# Patient Record
Sex: MALE | Race: WHITE | NOT HISPANIC OR LATINO
[De-identification: names, ages, dates, MRNs, and addresses within clinical notes are randomized per-mention and may not be internally consistent; named-entity substitution may affect disease eponyms.]

---

## 2018-10-22 ENCOUNTER — APPOINTMENT (OUTPATIENT)
Dept: SURGERY | Facility: CLINIC | Age: 63
End: 2018-10-22
Payer: MEDICAID

## 2018-10-22 VITALS
HEART RATE: 67 BPM | HEIGHT: 69.29 IN | BODY MASS INDEX: 20.5 KG/M2 | WEIGHT: 140 LBS | SYSTOLIC BLOOD PRESSURE: 116 MMHG | TEMPERATURE: 98 F | DIASTOLIC BLOOD PRESSURE: 71 MMHG

## 2018-10-22 PROBLEM — Z00.00 ENCOUNTER FOR PREVENTIVE HEALTH EXAMINATION: Status: ACTIVE | Noted: 2018-10-22

## 2018-10-22 PROCEDURE — 99203 OFFICE O/P NEW LOW 30 MIN: CPT

## 2022-03-14 ENCOUNTER — APPOINTMENT (OUTPATIENT)
Dept: SURGERY | Facility: CLINIC | Age: 67
End: 2022-03-14
Payer: MEDICAID

## 2022-03-14 VITALS
HEIGHT: 69 IN | BODY MASS INDEX: 20.44 KG/M2 | HEART RATE: 84 BPM | WEIGHT: 138 LBS | DIASTOLIC BLOOD PRESSURE: 71 MMHG | SYSTOLIC BLOOD PRESSURE: 117 MMHG | OXYGEN SATURATION: 99 %

## 2022-03-14 VITALS — TEMPERATURE: 96.4 F

## 2022-03-14 DIAGNOSIS — Z78.9 OTHER SPECIFIED HEALTH STATUS: ICD-10-CM

## 2022-03-14 DIAGNOSIS — I10 ESSENTIAL (PRIMARY) HYPERTENSION: ICD-10-CM

## 2022-03-14 DIAGNOSIS — N40.0 BENIGN PROSTATIC HYPERPLASIA WITHOUT LOWER URINARY TRACT SYMPMS: ICD-10-CM

## 2022-03-14 DIAGNOSIS — Z80.1 FAMILY HISTORY OF MALIGNANT NEOPLASM OF TRACHEA, BRONCHUS AND LUNG: ICD-10-CM

## 2022-03-14 PROCEDURE — 99203 OFFICE O/P NEW LOW 30 MIN: CPT

## 2022-03-14 RX ORDER — FINASTERIDE 1 MG/1
TABLET ORAL
Refills: 0 | Status: ACTIVE | COMMUNITY

## 2022-03-14 RX ORDER — FAMOTIDINE 20 MG/1
20 TABLET, FILM COATED ORAL
Qty: 60 | Refills: 0 | Status: ACTIVE | COMMUNITY
Start: 2022-02-15

## 2022-03-14 RX ORDER — AMLODIPINE BESYLATE 5 MG/1
TABLET ORAL
Refills: 0 | Status: ACTIVE | COMMUNITY

## 2022-03-14 RX ORDER — LOSARTAN POTASSIUM 100 MG/1
TABLET, FILM COATED ORAL
Refills: 0 | Status: ACTIVE | COMMUNITY

## 2022-03-14 RX ORDER — LACTULOSE 10 G/15ML
10 SOLUTION ORAL
Qty: 946 | Refills: 0 | Status: ACTIVE | COMMUNITY
Start: 2022-01-25

## 2022-03-14 RX ORDER — FEXOFENADINE HCL 180 MG/1
180 TABLET ORAL
Qty: 30 | Refills: 0 | Status: ACTIVE | COMMUNITY
Start: 2022-02-08

## 2022-03-14 RX ORDER — ALFUZOSIN HYDROCHLORIDE 10 MG/1
10 TABLET, EXTENDED RELEASE ORAL
Refills: 0 | Status: ACTIVE | COMMUNITY

## 2022-03-14 RX ORDER — OMEGA-3-ACID ETHYL ESTERS CAPSULES 1 G/1
1 CAPSULE, LIQUID FILLED ORAL
Qty: 120 | Refills: 0 | Status: ACTIVE | COMMUNITY
Start: 2022-01-03

## 2022-03-14 RX ORDER — ROSUVASTATIN CALCIUM 10 MG/1
10 TABLET, FILM COATED ORAL
Qty: 30 | Refills: 0 | Status: ACTIVE | COMMUNITY
Start: 2022-03-11

## 2022-03-14 RX ORDER — METHYLPREDNISOLONE 4 MG/1
4 TABLET ORAL
Qty: 21 | Refills: 0 | Status: ACTIVE | COMMUNITY
Start: 2022-01-03

## 2022-03-14 RX ORDER — CHOLECALCIFEROL (VITAMIN D3) 25 MCG
TABLET ORAL
Refills: 0 | Status: ACTIVE | COMMUNITY

## 2022-03-14 NOTE — CONSULT LETTER
[Dear  ___] : Dear  [unfilled], [Consult Letter:] : I had the pleasure of evaluating your patient, [unfilled]. [Please see my note below.] : Please see my note below. [Consult Closing:] : Thank you very much for allowing me to participate in the care of this patient.  If you have any questions, please do not hesitate to contact me. [Sincerely,] : Sincerely, [FreeTextEntry3] : Edson Mason MD, FACS

## 2022-03-14 NOTE — HISTORY OF PRESENT ILLNESS
[de-identified] : This is a 66 year  old patient who was referred by Dr. Marc Felton with the chief complaint of having left buttock mass.  He reports having this condition for many years. He denies any trauma to the area.   He denies any fever or  night sweats. Appetite is good and weight is stable.  He states that recently the mass started to  get  bigger and  more symptomatic. He wants to know if it could  be surgically  removed.\par

## 2022-03-14 NOTE — PHYSICAL EXAM
[Alert] : alert [Oriented to Person] : oriented to person [Oriented to Place] : oriented to place [Oriented to Time] : oriented to time [Calm] : calm [de-identified] : He  is alert, well-groomed, and in NAD\par   [de-identified] : anicteric.  Nasal mucosa pink, septum midline. Oral mucosa pink.  Tongue midline, Pharynx without exudates.\par   [de-identified] : Neck supple. Trachea midline. Thyroid isthmus barely palpable, lobes not felt.\par   [de-identified] : \par left buttock mass is mobile, Firm,   Smooth, non-tender,   Well defined. Superficial . No palpable lymph nodes. skin with bluish pigmentation    Mass size - 3  cm x  3 cm.

## 2022-03-14 NOTE — PLAN
[FreeTextEntry1] : Mr. LE  was told significance of findings, options, risks and benefits were explained.  Informed consent for excision     left buttock mass      and potential risks, benefits and alternatives (surgical options were discussed including non-surgical options or the option of no surgery) to the planned surgery were discussed in depth.  All surgical options were discussed including non-surgical treatments.  He wishes to proceed with surgery.  We will plan for surgery on at the next available date, pending any required insurance pre-certification or pre-approval. He agrees to obtain any necessary pre-operative evaluations and testing prior to surgery.\par Patient advised to seek immediate medical attention with any acute change in symptoms or with the development of any new or worsening symptoms.  Patient's questions and concerns addressed to patient's satisfaction, and patient verbalized an understanding of the information discussed.\par \par

## 2022-04-07 ENCOUNTER — OUTPATIENT (OUTPATIENT)
Dept: OUTPATIENT SERVICES | Facility: HOSPITAL | Age: 67
LOS: 1 days | End: 2022-04-07
Payer: MEDICAID

## 2022-04-07 VITALS
HEART RATE: 56 BPM | DIASTOLIC BLOOD PRESSURE: 82 MMHG | WEIGHT: 160.06 LBS | HEIGHT: 66 IN | OXYGEN SATURATION: 96 % | SYSTOLIC BLOOD PRESSURE: 125 MMHG | TEMPERATURE: 99 F | RESPIRATION RATE: 16 BRPM

## 2022-04-07 DIAGNOSIS — I10 ESSENTIAL (PRIMARY) HYPERTENSION: ICD-10-CM

## 2022-04-07 DIAGNOSIS — Z87.438 PERSONAL HISTORY OF OTHER DISEASES OF MALE GENITAL ORGANS: ICD-10-CM

## 2022-04-07 DIAGNOSIS — K21.9 GASTRO-ESOPHAGEAL REFLUX DISEASE WITHOUT ESOPHAGITIS: ICD-10-CM

## 2022-04-07 DIAGNOSIS — M79.89 OTHER SPECIFIED SOFT TISSUE DISORDERS: ICD-10-CM

## 2022-04-07 DIAGNOSIS — Z01.818 ENCOUNTER FOR OTHER PREPROCEDURAL EXAMINATION: ICD-10-CM

## 2022-04-07 DIAGNOSIS — Z78.9 OTHER SPECIFIED HEALTH STATUS: Chronic | ICD-10-CM

## 2022-04-07 PROCEDURE — G0463: CPT

## 2022-04-07 RX ORDER — SODIUM CHLORIDE 9 MG/ML
3 INJECTION INTRAMUSCULAR; INTRAVENOUS; SUBCUTANEOUS EVERY 8 HOURS
Refills: 0 | Status: DISCONTINUED | OUTPATIENT
Start: 2022-04-15 | End: 2022-04-22

## 2022-04-07 NOTE — H&P PST ADULT - NSICDXPASTMEDICALHX_GEN_ALL_CORE_FT
PAST MEDICAL HISTORY:  History of BPH     HTN (hypertension)     Hyperlipemia     Other specified soft tissue disorders

## 2022-04-07 NOTE — H&P PST ADULT - PROBLEM SELECTOR PLAN 4
Pt instructed to take famotidine with a sip of water the day of surgery. Pt will follow up with PMD post procedure.

## 2022-04-07 NOTE — H&P PST ADULT - FALL HARM RISK - UNIVERSAL INTERVENTIONS
Bed in lowest position, wheels locked, appropriate side rails in place/Call bell, personal items and telephone in reach/Instruct patient to call for assistance before getting out of bed or chair/[place] to call system/Physically safe environment - no spills, clutter or unnecessary equipment/Purposeful Proactive Rounding/Room/bathroom lighting operational, light cord in reach

## 2022-04-07 NOTE — H&P PST ADULT - HISTORY OF PRESENT ILLNESS
66 yr old male with PMH: BPH, (finasteride, alfuzosin) ,GERD (famotidine) ,HTN (Metoprolol) presents with specified soft tissue disorder. Pt had a raise area on his left buttock mass with black area on top no drainage or odor. Pt schedule for excision of left buttock mass on 4/15/2022.

## 2022-04-07 NOTE — H&P PST ADULT - NSICDXFAMILYHX_GEN_ALL_CORE_FT
FAMILY HISTORY:  Sibling  Still living? Yes, Estimated age: 75  Family history of diabetes mellitus (DM), Age at diagnosis: Age Unknown  FH: CHF (congestive heart failure), Age at diagnosis: Age Unknown

## 2022-04-07 NOTE — H&P PST ADULT - PROBLEM SELECTOR PLAN 3
schedule for excision of left buttock mass. Pt instructed on the use of chlorhexidene for 3 days including day of surgery. Pt repeated instructions back correctly and given written instructional sheet.      Stop Bang score=3 Pt intermediate risk for TEVIN. Pt will maintain a patent airway during his hospital stay

## 2022-04-07 NOTE — H&P PST ADULT - NSANTHOSAYNRD_GEN_A_CORE
No. TEVIN screening performed.  STOP BANG Legend: 0-2 = LOW Risk; 3-4 = INTERMEDIATE Risk; 5-8 = HIGH Risk

## 2022-04-07 NOTE — H&P PST ADULT - PROBLEM SELECTOR PLAN 2
Pt instructed to take Metoprolol with sip of water am of surgery. Pt instructed to remain NPO after 4/14/2022. Pt will continue regime post procedure with PMD

## 2022-04-14 ENCOUNTER — TRANSCRIPTION ENCOUNTER (OUTPATIENT)
Age: 67
End: 2022-04-14

## 2022-04-15 ENCOUNTER — TRANSCRIPTION ENCOUNTER (OUTPATIENT)
Age: 67
End: 2022-04-15

## 2022-04-15 ENCOUNTER — OUTPATIENT (OUTPATIENT)
Dept: OUTPATIENT SERVICES | Facility: HOSPITAL | Age: 67
LOS: 1 days | End: 2022-04-15
Payer: MEDICAID

## 2022-04-15 ENCOUNTER — RESULT REVIEW (OUTPATIENT)
Age: 67
End: 2022-04-15

## 2022-04-15 ENCOUNTER — APPOINTMENT (OUTPATIENT)
Dept: SURGERY | Facility: HOSPITAL | Age: 67
End: 2022-04-15
Payer: MEDICAID

## 2022-04-15 VITALS
OXYGEN SATURATION: 100 % | TEMPERATURE: 98 F | SYSTOLIC BLOOD PRESSURE: 115 MMHG | DIASTOLIC BLOOD PRESSURE: 68 MMHG | RESPIRATION RATE: 14 BRPM | HEART RATE: 58 BPM

## 2022-04-15 VITALS
SYSTOLIC BLOOD PRESSURE: 144 MMHG | HEIGHT: 66 IN | OXYGEN SATURATION: 99 % | RESPIRATION RATE: 16 BRPM | TEMPERATURE: 98 F | DIASTOLIC BLOOD PRESSURE: 65 MMHG | HEART RATE: 62 BPM | WEIGHT: 160.06 LBS

## 2022-04-15 DIAGNOSIS — Z01.818 ENCOUNTER FOR OTHER PREPROCEDURAL EXAMINATION: ICD-10-CM

## 2022-04-15 DIAGNOSIS — M79.89 OTHER SPECIFIED SOFT TISSUE DISORDERS: ICD-10-CM

## 2022-04-15 DIAGNOSIS — Z78.9 OTHER SPECIFIED HEALTH STATUS: Chronic | ICD-10-CM

## 2022-04-15 PROCEDURE — 21931 EXC BACK LES SC 3 CM/>: CPT | Mod: AS

## 2022-04-15 PROCEDURE — 11403 EXC TR-EXT B9+MARG 2.1-3CM: CPT

## 2022-04-15 PROCEDURE — 88305 TISSUE EXAM BY PATHOLOGIST: CPT

## 2022-04-15 PROCEDURE — 12032 INTMD RPR S/A/T/EXT 2.6-7.5: CPT

## 2022-04-15 PROCEDURE — 88305 TISSUE EXAM BY PATHOLOGIST: CPT | Mod: 26

## 2022-04-15 PROCEDURE — 21931 EXC BACK LES SC 3 CM/>: CPT

## 2022-04-15 RX ORDER — FINASTERIDE 5 MG/1
1 TABLET, FILM COATED ORAL
Qty: 0 | Refills: 0 | DISCHARGE

## 2022-04-15 RX ORDER — ACETAMINOPHEN WITH CODEINE 300MG-30MG
1 TABLET ORAL
Qty: 8 | Refills: 0
Start: 2022-04-15 | End: 2022-04-16

## 2022-04-15 RX ORDER — FAMOTIDINE 10 MG/ML
1 INJECTION INTRAVENOUS
Qty: 0 | Refills: 0 | DISCHARGE

## 2022-04-15 RX ORDER — ROSUVASTATIN CALCIUM 5 MG/1
1 TABLET ORAL
Qty: 0 | Refills: 0 | DISCHARGE

## 2022-04-15 RX ORDER — FENTANYL CITRATE 50 UG/ML
25 INJECTION INTRAVENOUS
Refills: 0 | Status: DISCONTINUED | OUTPATIENT
Start: 2022-04-15 | End: 2022-04-15

## 2022-04-15 RX ORDER — METOPROLOL TARTRATE 50 MG
1 TABLET ORAL
Qty: 0 | Refills: 0 | DISCHARGE

## 2022-04-15 RX ORDER — ACETAMINOPHEN 500 MG
1000 TABLET ORAL ONCE
Refills: 0 | Status: DISCONTINUED | OUTPATIENT
Start: 2022-04-15 | End: 2022-04-22

## 2022-04-15 RX ORDER — SODIUM CHLORIDE 9 MG/ML
1000 INJECTION, SOLUTION INTRAVENOUS
Refills: 0 | Status: DISCONTINUED | OUTPATIENT
Start: 2022-04-15 | End: 2022-04-22

## 2022-04-15 RX ORDER — LOSARTAN POTASSIUM 100 MG/1
1 TABLET, FILM COATED ORAL
Qty: 0 | Refills: 0 | DISCHARGE

## 2022-04-15 RX ORDER — ALFUZOSIN HYDROCHLORIDE 10 MG/1
1 TABLET, EXTENDED RELEASE ORAL
Qty: 0 | Refills: 0 | DISCHARGE

## 2022-04-15 NOTE — ASU DISCHARGE PLAN (ADULT/PEDIATRIC) - WILL THE PATIENT ACCEPT THE PFIZER COVID-19 VACCINE IF ELIGIBLE AND IT IS AVAILABLE?
FLYNN: The patient plans to discharge home with [facility] and wife, Rigoberto [place] ([phone]) to transport when stable for discharge. RUR: N/A    1. The patient is from home and lives with his wife. 2. Orthopedics, PT/OT following. 3. The patient plans to discharge home with home health and wife to transport. Observation notice provided in writing to patient and/or caregiver as well as verbal explanation of the policy. Patients who are in outpatient status also receive the Observation notice. Patient has received notice and or patient representative has received via secure email, fax, or certified mail based on patient representative's preference. Care Management Interventions  PCP Verified by CM: Yes  Palliative Care Criteria Met (RRAT>21 & CHF Dx)?: No  Mode of Transport at Discharge: Other (see comment)  Transition of Care Consult (CM Consult): [address]: No  Reason Outside [place]: Physician referred to specific agency  MyChart Signup: No  Discharge Durable Medical Equipment: No  Health Maintenance Reviewed: Yes  Physical Therapy Consult: Yes  Occupational Therapy Consult: Yes  Speech Therapy Consult: No  Current Support Network: Lives with Spouse  Confirm Follow Up Transport: Family  The Plan for Transition of Care is Related to the Following Treatment Goals : The patient plans to discharge home with home health and wife to transport.   The Patient and/or Patient Representative was Provided with a Choice of Provider and Agrees with the Discharge Plan?: Yes  Freedom of Choice List was Provided with Basic Dialogue that Supports the Patient's Individualized Plan of Care/Goals, Treatment Preferences and Shares the Quality Data Associated with the Providers?: Yes  [place] Resource Information Provided?: No  Discharge Location  Discharge Placement: Home with home health     Reason for Admission: Status post left unicompartmental knee replacement                     RUR Score:  N/A                   Plan for utilizing home health: The Plan for Transition of Care is related to the following treatment goals: Home Health    The Patient and/or patient representative Haley Evans was provided with a choice of provider and agrees   with the discharge plan. [x] Yes [] No    Freedom of choice list was provided with basic dialogue that supports the patient's individualized plan of care/goals, treatment preferences and shares the quality data associated with the providers. [x] Yes [] No         PCP: [facility] name:  Katie Ariza MD, phone: [phone]      Name of Practice:    Are you a current patient: Yes/No: Yes   Approximate date of last visit: July, 2021   Can you participate in a virtual visit with your PCP: Yes                    Current Advanced Directive/Advance Care Plan: Full Code      Healthcare Decision Maker:   Click here to complete [address] including selection of the Healthcare Decision Maker Relationship (ie \"Primary\")                      Transition of Care Plan:       CM met with the patient in room 561. The patient is alert and oriented x4. Confirmed demographics. The patient is independent with ADL's/IADL's, drives a vehicle and uses [facility] pharmacy in [place] [address]. The patient's wife is going to buy a walker from the pharmacy here. The patient plans to discharge home with home health and wife to transport when stable for discharge. CM following for discharge needs.     Anthony Koehler RN/CRM No

## 2022-04-15 NOTE — BRIEF OPERATIVE NOTE - NSICDXBRIEFPOSTOP_GEN_ALL_CORE_FT
POST-OP DIAGNOSIS:  Mass of soft tissue of hip 15-Apr-2022 14:10:47 Left Buttock Mass Bernie Teixeira

## 2022-04-15 NOTE — ASU DISCHARGE PLAN (ADULT/PEDIATRIC) - CARE PROVIDER_API CALL
Edson Mason)  Surgery  [address] [facility], [place] [address]  Phone: [phone]  Fax: [phone]  Follow Up Time:    Edson Mason)  Surgery  [address] [facility], [place] [address]  Phone: [phone]  Fax: [phone]  Follow Up Time: 2 weeks

## 2022-04-15 NOTE — BRIEF OPERATIVE NOTE - NSICDXBRIEFPREOP_GEN_ALL_CORE_FT
PRE-OP DIAGNOSIS:  Mass of soft tissue of hip 15-Apr-2022 14:10:36 Left Buttock Mass Bernie Teixeira

## 2022-04-15 NOTE — ASU DISCHARGE PLAN (ADULT/PEDIATRIC) - NS MD DC FALL RISK RISK
For information on Fall & Injury Prevention, visit: [email or web address] OR  [email or web address] OR  https://www.cdc.gov/steadi/patient.html

## 2022-04-15 NOTE — BRIEF OPERATIVE NOTE - NSICDXBRIEFPROCEDURE_GEN_ALL_CORE_FT
PROCEDURES:  Excision, soft tissue tumor, leg area, subfascial, less than 5 cm 15-Apr-2022 14:09:57 Left Buttock Bernie Teixeira

## 2022-04-15 NOTE — ASU PATIENT PROFILE, ADULT - FALL HARM RISK - CONCLUSION
DIAPHORETIC  confused  Unable to perform because patient is post ictal
Universal Safety Interventions

## 2022-04-19 PROBLEM — Z87.438 PERSONAL HISTORY OF OTHER DISEASES OF MALE GENITAL ORGANS: Chronic | Status: ACTIVE | Noted: 2022-04-07

## 2022-04-19 PROBLEM — I10 ESSENTIAL (PRIMARY) HYPERTENSION: Chronic | Status: ACTIVE | Noted: 2022-04-07

## 2022-04-19 PROBLEM — M79.89 OTHER SPECIFIED SOFT TISSUE DISORDERS: Chronic | Status: ACTIVE | Noted: 2022-04-07

## 2022-04-19 PROBLEM — E78.5 HYPERLIPIDEMIA, UNSPECIFIED: Chronic | Status: ACTIVE | Noted: 2022-04-07

## 2022-04-21 LAB — SURGICAL PATHOLOGY STUDY: SIGNIFICANT CHANGE UP

## 2022-04-25 ENCOUNTER — APPOINTMENT (OUTPATIENT)
Dept: SURGERY | Facility: CLINIC | Age: 67
End: 2022-04-25
Payer: MEDICAID

## 2022-04-25 VITALS — TEMPERATURE: 96.5 F

## 2022-04-25 DIAGNOSIS — M79.89 OTHER SPECIFIED SOFT TISSUE DISORDERS: ICD-10-CM

## 2022-04-25 PROCEDURE — 99024 POSTOP FOLLOW-UP VISIT: CPT

## 2022-04-25 NOTE — ASSESSMENT
[FreeTextEntry1] : Mr. LE is doing well, with excellent post-operative recovery. The surgical incision is healing well and as expected. There is no evidence of infection or complication, and patient is progressing as expected. Post-operative wound care, activity, restrictions and precautions reinforced.  Pathology results were discussed in details. Patient's questions and concerns addressed to patient's satisfaction.\par

## 2022-04-25 NOTE — PHYSICAL EXAM
[Calm] : calm [de-identified] : He  is alert, well-groomed, and in NAD\par    [de-identified] : Surgical wound is healing well.   no signs of  inflammation or infection.

## 2022-04-25 NOTE — PLAN
[FreeTextEntry1] : Mr. LE will follow up  if needed. Warning signs, follow up, and restrictions were discussed with the patient.

## 2022-04-25 NOTE — HISTORY OF PRESENT ILLNESS
[de-identified] : Mr. LE  is s/p excision Left buttock mass on 04/15/2022. Patient's pathology results were  consistent with Epidermal inclusion cyst. Today  Mr. LE offers no complaints. patient reports no fever or  chills. patient reports occasional discomfort in the surgical area.  His surgical incisions are healing well. No signs of inflammation, infection or exudate. patient reports good bowel movements and appetite. \par

## 2022-04-25 NOTE — DATA REVIEWED
[FreeTextEntry1] : Jadiel Accession Number : [id number]\par Patient:   MESERET LE\par \par \par Accession:                             [id number]\par \par Collected Date/Time:                   4/15/2022 13:57 EDT\par Received Date/Time:                    4/18/2022 08:00 EDT\par \par Surgical Pathology Report - Auth (Verified)\par \par Specimen(s) Submitted\par 1  Left buttock mass\par \par Final Diagnosis\par Buttock, left, excision of mass: Epidermal inclusion cyst\par \par Verified by: Jaz Kruse M.D.\par (Electronic Signature)\par Reported on: 04/21/22 14:00 EDT, [facility], [address]\par [address], [place] [address]\par _________________________________________________________________\par \par Clinical Information\par Left buttock mass\par

## 2022-05-24 LAB — SARS-COV-2 N GENE NPH QL NAA+PROBE: NOT DETECTED

## 2022-09-30 NOTE — ASU PATIENT PROFILE, ADULT - NS PRO ABUSE SCREEN AFRAID ANYONE YN
no Expected Date Of Service: 09/30/2022 Bill For Surgical Tray: no Performing Laboratory: -[id number] Billing Type: Third-Party Bill
